# Patient Record
Sex: FEMALE | Race: OTHER
[De-identification: names, ages, dates, MRNs, and addresses within clinical notes are randomized per-mention and may not be internally consistent; named-entity substitution may affect disease eponyms.]

---

## 2021-01-12 ENCOUNTER — HOSPITAL ENCOUNTER (OUTPATIENT)
Dept: HOSPITAL 61 - LAB | Age: 56
End: 2021-01-12
Attending: ORTHOPAEDIC SURGERY
Payer: COMMERCIAL

## 2021-01-12 DIAGNOSIS — Z20.828: ICD-10-CM

## 2021-01-12 DIAGNOSIS — Z01.812: Primary | ICD-10-CM

## 2021-01-12 PROCEDURE — U0003 INFECTIOUS AGENT DETECTION BY NUCLEIC ACID (DNA OR RNA); SEVERE ACUTE RESPIRATORY SYNDROME CORONAVIRUS 2 (SARS-COV-2) (CORONAVIRUS DISEASE [COVID-19]), AMPLIFIED PROBE TECHNIQUE, MAKING USE OF HIGH THROUGHPUT TECHNOLOGIES AS DESCRIBED BY CMS-2020-01-R: HCPCS

## 2021-01-14 NOTE — PDOC1
History and Physical


Date of Admission


Date of Admission


1/15/2021





Identification/Chief Complaint


Chief Complaint


Right knee pain





Source


Source:  Chart review, Patient





History of Present Illness


History of Present Illness


55-year-old wtih right knee pain. Works as a deputy. History of right knee 

meniscus tear with arthroscopic meniscectomy by me in . She describes p

rogressive bilateral knee pain for years that has since especially progressed 

after working a lot of overtime and shopping for [employer]. Patient locates 

sharp and throbbing pain that became so severe that she eventually presented to 

[facility] ED on 20 where an MRI was ordered, as shown

below with recurrent meniscus tear. Since then, patient reports that her pain 

has persisted and is aggravated by movement, walking, and standing. She also 

notices some mechanical loose body symptoms: locking with a feeling like her 

knee "needs to pop". She has used a brace which has helped. Ambulating with 

single crutch.





Current Medications


Current Medications





Current Medications


Ondansetron HCl (Zofran) 4 mg PRN Q6HRS  PRN IV NAUSEA/VOMITING;  Start 1/15/21 

at 07:00;  Stop 21 at 06:59


Fentanyl Citrate (Fentanyl 2ml Vial) 25 mcg PRN Q5MIN  PRN IV MILD PAIN 1-3;  

Start 1/15/21 at 07:00;  Stop 21 at 06:59


Fentanyl Citrate (Fentanyl 2ml Vial) 50 mcg PRN Q5MIN  PRN IV MODERATE TO SEVERE

PAIN;  Start 1/15/21 at 07:00;  Stop 21 at 06:59


Morphine Sulfate (Morphine Sulfate) 1 mg PRN Q10MIN  PRN IV SEVERE PAIN 7-10;  

Start 1/15/21 at 07:00;  Stop 21 at 06:59;  Status UNV


Ringer's Solution 1,000 ml @  30 mls/hr Q24H IV ;  Start 1/15/21 at 07:00;  Stop

1/15/21 at 18:59


Lidocaine HCl (Xylocaine-Mpf 1% 2ml Vial) 2 ml PRN 1X  PRN ID PRIOR TO IV START;

 Start 1/15/21 at 07:00;  Stop 21 at 06:59


Hydromorphone HCl (Dilaudid) 0.5 mg PRN Q10MIN  PRN IV SEV PAIN, Second choice; 

Start 1/15/21 at 07:00;  Stop 21 at 06:59;  Status UNV


Prochlorperazine Edisylate (Compazine) 5 mg PACU PRN  PRN IV NAUSEA, MRX1;  

Start 1/15/21 at 07:00;  Stop 21 at 06:59


Vancomycin HCl 250 ml @  250 mls/hr 1X PREOP  PRN IV PRIOR TO SURGERY;  Start 

21 at 17:30





Active Scripts


Active


Reported


Azelastine Hcl 137 Mcg/0.137 Ml Spray.pump 137 Mcg NS DAILY


Fluticasone Propionate Nasal Spray (Fluticasone Propionate) 16 Gm Spray.susp 2 

Spray NS DAILY


Aspirin 81 Mg Tab.chew 81 Mg PO DAILY


Hydrochlorothiazide Capsule  ** (Hydrochlorothiazide) 12.5 Mg Capsule 12.5 Mg PO

DAILY


Lovastatin 20 Mg Tablet 20 Mg PO HS


Lotrel 5-10 Mg Capsule (Amlodipine Besylate/Benazepril) 1 Each Capsule 1 Each PO

DAILY


Flax Oil (Flaxseed Oil) 1,000 Mg Capsule 1,000 Mg PO DAILY


Daily Value (Multivitamin) 1 Each Tablet 1 Each PO DAILY





Allergies


Allergies:  


Coded Allergies:  


     Penicillins (Verified  Allergy, Intermediate, SWELLING, ITCHING, RASH, 

21)


     codeine (Verified  Allergy, Intermediate, SWELLING,ITCHING, RASH, 21)


     hydrocodone (Verified  Allergy, Intermediate, SWELLING, ITCHING, 21)


     ibuprofen (Verified  Allergy, Intermediate, SWELLING,ITCHING, RASH, 

21)


     clindamycin (Verified  Allergy, Mild, Nausea and Vomiting, 21)


     tramadol (Verified  Allergy, Mild, Nausea and Vomiting, 21)





Physical Exam


General:  Alert, Cooperative


HEENT:  Atraumatic


Lungs:  Normal air movement


Heart:  RRR


Abdomen:  Soft


Extremities:  Other (The RIGHT knee shows normal alignment, no masses and no 

effusion. There is tenderness at the medial joint line and a positive medial 

Aundrea's test. The lateral joint line shows no tenderness. Range of motion is 

0-135 degrees. There is trace patellofemoral crepitus. Aundrea's test is 

positive. There is medial joint line pain with deep flexion and especially with 

rotation of the tibia. The knee is stable to varus and valgus stress without 

subluxation or laxity. The ACL feels intact on Lachman testing. Muscle strength 

is normal (5/5) for quadriceps and hamstrings, and muscle tone is normal. The 

skin is normal with no scars, rashes, lesions or ulcers. Light touch sensation 

is intact. No edema and no varicosities. Dorsalis pedis pulse is intact and 

capillary refill is normal.  )





Images


Images


Outside MRI from [facility] right knee 2020 shows 

distinct medial meniscus tear series 401 image 13 and 12. There is some 

posterior femoral chondromalacia on series 401 image 11.


Impression:


1. Horizontal tear extends through the posterior horn and body of the medial 

meniscus.


2. More vertical tearing of the junction of the posterior horn and body of 

lateral meniscus.


3. Ligaments are intact.


4. A loose body is noted along the inferior aspect of the suprapatellar bursa 

measuring 8 mm.











[facility]  


                              [address]  [place], [place] [id number] [phone]  


 


                                           IMAGING REPORT  


 


                                               Signed  


 


PATIENT: CHELSEY CURRIE    ACCOUNT: [id number]            MRN#: 

[id number]  


: 1965                  LOCATION: [facility]               AGE: 55  


SEX: F                           EXAM DT: 20                ACCESSION#: 

[id number]  


STATUS: PRE CLI                  ORD. PHYSICIAN: GATO COY MD     


REASON: BILAT KNEE PAIN  


PROCEDURE: KNEE STANDING BILAT AP  


 


XR KNEE_AP BILAT STANDING, XR KNEE 1-2 VIEWS 2020 9:36 AM  


 


 INDICATION: Bilateral knee pain  


 


 COMPARISON: None available.  


 


 TECHNIQUE:  3 views the right and 3 views left knee are provided.  


 


 FINDINGS/  


 IMPRESSION:  


 


 No significant knee joint effusion. There is no acute fracture or dislocation. 

Joint mild 


patellofemoral joint space narrowing of the right knee with marginal 

osteophytosis as well as mild 


osteoarthrosis. Mild bilateral medial femorotibial osteoarthrosis with mild 

joint space scarring. 


Bone mineralization is within normal limits. Regional soft tissues are within 

normal limits. There 


is no soft tissue gas or osseous erosion. No radiopaque foreign body.  


 


 Electronically signed by: Erin Peñaloza MD (2020 10:51 AM) 

[id number]  


 


 


 


 


DICTATED and SIGNED BY:     ERIN PEÑALOZA MD  


DATE:     20 1050





VTE Prophylaxis Ordered


VTE Prophylaxis Devices:  Yes


VTE Pharmacological Prophylaxi:  Yes





Assessment/Plan


Assessment/Plan


Her MRI shows a medial and lateral meniscus tear. We reviewed her MRI with 

previous x-rays and discussed the natural history of the condition as well as 

the risks, benefits, and alternatives to treatment. Given her failure of more 

conservative measures and continued severe pain with loss of function, my 

recommendation is arthroscopic surgery. Plan for right knee arthroscopy with 

meniscectomy and possible loose body removal. We discussed potential risks of 

arthroscopic surgery, including risks of bleeding, infection, progressive 

arthritis,


blood clots, or other potential surgical or anesthetic complications. We also 

discussed postoperative treatment and expectations including progression of 

arthritis following knee arthroscopy. Meniscus tears unfortunately are a risk 

factor to develop osteoarthritis. My current recommendation is for arthroscopic 

surgery considering her age and the minimal degenerative changes seen 

radiographically. The complex meniscus tear is a sign that her knee will further

deteriorate in the future, hopefully years from now, and she might ultimately 

need knee replacement when she is in her 60s or 70s. I do not recommend knee 

replacement with these minimal degenerative changes, and believe that she has 

much to benefit from an arthroscopic procedure. All of her questions were 

answered and she desires to proceed with surgery.  She is here today for 

elective right knee arthroscopic medial and lateral meniscectomy.





Justifications for Admission


Other Justification














GATO COY MD                 2021 19:44

## 2021-01-15 ENCOUNTER — HOSPITAL ENCOUNTER (OUTPATIENT)
Dept: HOSPITAL 61 - SURG | Age: 56
Discharge: HOME | End: 2021-01-15
Attending: ORTHOPAEDIC SURGERY
Payer: COMMERCIAL

## 2021-01-15 VITALS
DIASTOLIC BLOOD PRESSURE: 78 MMHG | DIASTOLIC BLOOD PRESSURE: 78 MMHG | SYSTOLIC BLOOD PRESSURE: 168 MMHG | SYSTOLIC BLOOD PRESSURE: 168 MMHG | SYSTOLIC BLOOD PRESSURE: 168 MMHG | SYSTOLIC BLOOD PRESSURE: 168 MMHG | DIASTOLIC BLOOD PRESSURE: 78 MMHG | DIASTOLIC BLOOD PRESSURE: 78 MMHG | DIASTOLIC BLOOD PRESSURE: 78 MMHG | SYSTOLIC BLOOD PRESSURE: 168 MMHG

## 2021-01-15 VITALS — HEIGHT: 64.5 IN | WEIGHT: 225 LBS | BODY MASS INDEX: 37.95 KG/M2

## 2021-01-15 DIAGNOSIS — Z88.1: ICD-10-CM

## 2021-01-15 DIAGNOSIS — E78.00: ICD-10-CM

## 2021-01-15 DIAGNOSIS — S83.231A: Primary | ICD-10-CM

## 2021-01-15 DIAGNOSIS — I10: ICD-10-CM

## 2021-01-15 DIAGNOSIS — Z79.82: ICD-10-CM

## 2021-01-15 DIAGNOSIS — M94.261: ICD-10-CM

## 2021-01-15 DIAGNOSIS — Z79.899: ICD-10-CM

## 2021-01-15 DIAGNOSIS — Z90.710: ICD-10-CM
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
## 2021-01-15 DIAGNOSIS — Z88.8: ICD-10-CM

## 2021-01-15 DIAGNOSIS — Z88.0: ICD-10-CM
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
## 2021-01-15 DIAGNOSIS — Z98.51: ICD-10-CM

## 2021-01-15 DIAGNOSIS — Z98.890: ICD-10-CM

## 2021-01-15 DIAGNOSIS — S83.271A: ICD-10-CM

## 2021-01-15 DIAGNOSIS — Z88.5: ICD-10-CM
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
## 2021-01-15 DIAGNOSIS — K21.9: ICD-10-CM

## 2021-01-15 DIAGNOSIS — G47.30: ICD-10-CM

## 2021-01-15 PROCEDURE — 29880 ARTHRS KNE SRG MNISECTMY M&L: CPT

## 2021-01-15 RX ADMIN — FENTANYL CITRATE PRN MCG: 50 INJECTION INTRAMUSCULAR; INTRAVENOUS at 13:21

## 2021-01-15 RX ADMIN — SODIUM CHLORIDE, SODIUM LACTATE, POTASSIUM CHLORIDE, AND CALCIUM CHLORIDE SCH MLS/HR: .6; .31; .03; .02 INJECTION, SOLUTION INTRAVENOUS at 10:27

## 2021-01-15 RX ADMIN — SODIUM CHLORIDE, SODIUM LACTATE, POTASSIUM CHLORIDE, AND CALCIUM CHLORIDE SCH MLS/HR: .6; .31; .03; .02 INJECTION, SOLUTION INTRAVENOUS at 13:07

## 2021-01-15 RX ADMIN — FENTANYL CITRATE PRN MCG: 50 INJECTION INTRAMUSCULAR; INTRAVENOUS at 13:06

## 2021-01-15 NOTE — PDOC4
Operative Note


Operative Note


Date of Procedure:   January 15, 2021


Preoperative Diagnosis:  right knee medial and lateral meniscus tears


Postoperative Diagnosis:  


* complex tear medial meniscus, current injury, right knee, initial encounter, 

  S83.231A


* complex tear of lateral meniscus, current injury, right knee, initial 

  encounter, S83.271A


Procedures Performed:    right knee arthroscopy, surgical, with meniscectomy, 

medial AND lateral, including meniscal shaving, including debridement/shaving of

articular cartilage (chondroplasty) CPT 37671


Surgeon:  Gato Che MD


Assistant:  KIRBY Spann


Anesthesia:  General


Estimated Blood Loss:  10 mL   


Specimens:  none 


Drains:  none  


Complications:  none   


Tourniquet time:  34 minutes at 300 mm Hg





Indications for Procedure: The patient is a 55-year-old with right knee pain, 

unrelieved with nonoperative treatment. Exam and MRI are consistent with a 

meniscus tear. We talked about the risks and benefits of proceeding with an 

arthroscopic procedure. We talked about potential risks of ongoing pain, 

progressive arthritis, bleeding, infection, blood clots, or other potential 

surgical or anesthetic complications. All of the patient's questions about 

surgery were answered and they desired to proceed. Written consent was obtained.







Description of Operation:


The patient was identified in the preoperative holding area. The correct right 

knee was marked by me. The patient was taken to the operating room, where a 

general anesthetic was used. Preoperative antibiotics were given intravenously. 

A time-out procedure was performed. A tourniquet was placed on the upper thigh. 

Local anesthetic 20 mL of 0.25% bupivacaine was injected using sterile 

technique into the knee joint. The limb was prepared circumferentially with 

ChloraPrep solution and sterile waterproof arthroscopy drapes were applied. The

limb was exsanguinated with an Esmarch bandage and the tourniquet was inflated.





Lateral and medial arthroscopy portals were established. The medial meniscus 

showed a complex unrepairable tear with unstable flaps.  A meniscectomy was 

performed with basket forceps and the motorized shaver back to a smooth stable 

base, and the resection tapered into the middle one-third of the meniscus.The 

medial tibiofemoral joint showed chondromalacia Outerbridge grade I, so no 

chondroplasty was required.The intercondylar notch was free of loose bodies, and

the ACL was intact. 





The lateral tibiofemoral joint showed a complex unrepairable meniscus tear, and 

a meniscectomy was performed with basket forceps and the motorized shaver back 

to a smooth stable base.The lateral articular surfaces showed chondromalacia 

Outerbridge grade II, so a shaving chondroplasty was performed removing loose 

unstable fragments of articular cartilage. 





The patellofemoral joint showed chondromalacia Outerbridge grade II, so a 

shaving chondroplasty was performed removing loose unstable fragments of 

articular cartilage. The suprapatellar pouch, medial and lateral gutters were 

free of loose bodies.  There was a little bit of synovium around the patella 

which maybe is mimicking the loose body.  No loose body could be detected.  I a

lso placed the arthroscope in the posterior compartment and there was no loose 

body in that location.  Copious irrigation was used to drain all meniscal and 

chondral fragments, and the knee was drained of fluid. The portals were closed 

with #3-0 Prolene interrupted sutures.  Additional local anesthetic, 30 mL of 

0.25% bupivacaine with epinephrine was injected. A bulky sterile dressing was 

applied and the tourniquet was released. 





Needle and sponge counts were correct and there were no apparent complications.











GATO CHE MD                 Raad 15, 2021 13:07

## 2021-07-30 ENCOUNTER — HOSPITAL ENCOUNTER (OUTPATIENT)
Dept: HOSPITAL 61 - KCIC MRI | Age: 56
End: 2021-07-30
Payer: COMMERCIAL

## 2021-07-30 DIAGNOSIS — M48.07: ICD-10-CM

## 2021-07-30 DIAGNOSIS — M47.816: Primary | ICD-10-CM

## 2021-07-30 DIAGNOSIS — M51.47: ICD-10-CM

## 2021-07-30 DIAGNOSIS — M51.27: ICD-10-CM

## 2021-07-30 DIAGNOSIS — M43.17: ICD-10-CM

## 2021-07-30 DIAGNOSIS — M25.78: ICD-10-CM

## 2021-07-30 PROCEDURE — 72148 MRI LUMBAR SPINE W/O DYE: CPT

## 2021-07-30 NOTE — KCIC
EXAM: Lumbar spine MRI without contrast.



HISTORY: Pain.



TECHNIQUE: Multiplanar, multisequence magnetic resonance imaging of the lumbar spine was performed wi
thout contrast.



COMPARISON: 8/7/2006



FINDINGS: There is mild lumbar levocurvature. There is 3 mm retrolisthesis of L5 on S1. There is dege
nerative endplate remodeling with disc space narrowing, osteophytosis, Schmorl's node formation and d
isc desiccation at L5-S1. There is also disc desiccation at L4-L5. There are few osseous hemangiomas.
 There is no suspicious osseous lesion. There is no fracture. The conus terminates at L1.



At L1-L2, there is no stenosis.



At L2-L3, there is no stenosis.



At L3-L4, there is a minimal disc bulge and endplate remodeling. There is mild left facet arthropathy
. There is no stenosis.



At L4-L5, there is a shallow broad-based left paracentral to lateral recess disc protrusion with arely
lar tear superimposed on a disc bulge and endplate remodeling. There is mild bilateral facet arthropa
thy. There is mild left foraminal stenosis. There is narrowing of the left lateral recess. There is m
inimal central canal stenosis.



At L5-S1, there is a shallow broad-based posterior central disc protrusion with 3 mmHg inferior extru
rustam and there are bilateral foraminal to lateral disc osteophyte complexes superimposed on a diffuse
 disc bulge and endplate remodeling. There is slight retrolisthesis. There is moderate right and mild
 left foraminal stenosis with abutment the exiting right greater than left L5 nerve roots.



IMPRESSION: Degenerative change at the lower lumbar levels, described in detail above. This is minima
lly increased compared to the prior study, resulting in mild left foraminal stenosis and narrowing of
 the left lateral recess with minimal central canal stenosis at L4-L5 and moderate right and mild lef
t foraminal stenosis at L5-S1.



Electronically signed by: Kaye Truong MD (7/30/2021 4:28 PM) [id number]

## 2021-08-24 ENCOUNTER — HOSPITAL ENCOUNTER (OUTPATIENT)
Dept: HOSPITAL 61 - PNCL | Age: 56
Discharge: HOME | End: 2021-08-24
Attending: ANESTHESIOLOGY
Payer: COMMERCIAL

## 2021-08-24 DIAGNOSIS — Z90.710: ICD-10-CM

## 2021-08-24 DIAGNOSIS — K21.9: ICD-10-CM

## 2021-08-24 DIAGNOSIS — Z88.8: ICD-10-CM

## 2021-08-24 DIAGNOSIS — G47.30: ICD-10-CM

## 2021-08-24 DIAGNOSIS — E78.00: ICD-10-CM

## 2021-08-24 DIAGNOSIS — Z88.0: ICD-10-CM

## 2021-08-24 DIAGNOSIS — Z88.1: ICD-10-CM

## 2021-08-24 DIAGNOSIS — M19.90: ICD-10-CM

## 2021-08-24 DIAGNOSIS — I10: ICD-10-CM

## 2021-08-24 DIAGNOSIS — Z88.5: ICD-10-CM

## 2021-08-24 DIAGNOSIS — M54.5: Primary | ICD-10-CM

## 2021-08-24 DIAGNOSIS — Z98.890: ICD-10-CM

## 2021-08-24 DIAGNOSIS — M79.605: ICD-10-CM

## 2021-08-24 DIAGNOSIS — Z98.51: ICD-10-CM

## 2021-08-24 DIAGNOSIS — Z79.82: ICD-10-CM

## 2021-08-24 DIAGNOSIS — Z79.899: ICD-10-CM

## 2021-08-24 PROCEDURE — G0463 HOSPITAL OUTPT CLINIC VISIT: HCPCS

## 2021-08-24 NOTE — PDOC1
INITIAL PAIN CONSULT


DATE OF SERVICE:


DOS:


DATE: 8/24/21 


TIME: 15:56





CHIEF COMPLAINT:


Chief Complaint:


Low back and left lower extremity pain





HISTORY OF PRESENT ILLNESS:


56-year-old female presents with history of pain low back left lower extremity 

for many years worse over the past 1 year or so not the result of any specific 

injury or accident that she is aware but has been multiple injuries over the 

years by her report.  Patient reports pain in the low back now rating the left 

lower extremity posterior gluteus posterior lateral thigh lateral anterior thigh

anteromedial thigh medial lower leg and calf on the left side primarily some on 

the right but mostly on the left patient reports is worse with walking standing 

changing positions getting up from a seated position is waking her from sleep at

night least once or twice through the night patient reports does affect her 

ability to walk which does not use any assistive devices.  Patient is starting 

physical therapy tomorrow patient reports the pain is sharp in the low back 

throbbing the legs intermittent intensity tingling and numbness in the back and 

leg cramping and aching in the left lower extremity as well as in the low back. 

Patient reports her disability rating 0-10 10 made worse with 9 with family home

responsibilities 10 with recreation social activity and occupational activity 8 

with self-care and 8 with light support activities specially with sleeping.  

Patient has been taking over-the-counter medications such as Tylenol and Motrin 

which do decrease the pain fairly significantly but only by about 30 to 40% and 

Advil tends to work better.  Patient did have an MRI scan lumbar spine show 

degenerative change in the lower lumbar levels with L4-5 shallow broad-based 

left paracentral to lateral recess disc protrusion and annular tear with mild 

foraminal stenosis L5-S1 shows central broad-based central disc protrusion with 

bilateral to lateral disc osteophyte complexes and moderate right and mild left 

foraminal stenosis with abutment of the exiting right greater than left L5 nerve

roots.





PAST MEDICAL HISTORY:


PMH:


Hypertension, arthritis, multiple allergies





PREVIOUS SURGERIES:


Past Surgical Hx:


Hysterectomy, tubal ligation, right colon resection





CURRENT MEDICATIONS:


Current Meds:





Active Scripts








 Medications  Dose


 Route/Sig


 Max Daily Dose Days Date Category Dose


Instructions


 


 Vitamin D3 **


  (Vitamin D) 125


 Mcg Capsule  125 Mcg


 PO DAILY


   8/24/21 Reported  5,000 UNITS = 125 MCG


 


 Klor-Con 10


  (Potassium


 Chloride) 10 Meq


 Tablet.er  1 Tab


 PO DAILY


  30 8/24/21 Reported 


 


 Turmeric 500 mg


 Capsule


  (Turmeric/Turmeric


 Root Extract) 1


 Each Capsule  1 Cap


 PO BID


  30 8/24/21 Reported 


 


 Azelastine Hcl


 137 Mcg/0.137 Ml


 Spray.pump  137 Mcg


 NS DAILY


   1/13/21 Reported 


 


 Fluticasone


 Propionate Nasal


 Spray


  (Fluticasone


 Propionate) 16 Gm


 Spray.susp  2 Spray


 NS DAILY


   1/13/21 Reported 


 


 Aspirin 81 Mg


 Tab.chew  81 Mg


 PO DAILY


   1/13/21 Reported 


 


 Hydrochlorothiazide


 Capsule  **


  (Hydrochlorothiazide)


 12.5 Mg Capsule  12.5 Mg


 PO DAILY


   1/13/21 Reported 


 


 Lovastatin 20 Mg


 Tablet  20 Mg


 PO HS


   1/13/21 Reported 


 


 Lotrel 5-10 Mg


 Capsule


  (Amlodipine


 Besylate/Benazepril)


 1 Each Capsule  1 Each


 PO DAILY


   1/13/21 Reported 


 


 Flax Oil


  (Flaxseed Oil)


 1,000 Mg Capsule  1,000 Mg


 PO DAILY


   1/13/21 Reported 


 


 Daily Value


  (Multivitamin) 1


 Each Tablet  1 Each


 PO DAILY


   1/13/21 Reported 











ALLERGIES;


Allergies:  


Coded Allergies:  


     Penicillins (Verified  Allergy, Intermediate, SWELLING, ITCHING, RASH, 

1/15/21)


     codeine (Verified  Allergy, Intermediate, SWELLING,ITCHING, RASH, 1/15/21)


     hydrocodone (Verified  Allergy, Intermediate, SWELLING, ITCHING, 1/15/21)


     ibuprofen (Verified  Allergy, Intermediate, SWELLING,ITCHING, RASH, 

1/15/21)


     clindamycin (Verified  Allergy, Mild, Nausea and Vomiting, 1/15/21)


     tramadol (Verified  Allergy, Mild, Nausea and Vomiting, 1/15/21)





FAMILY HISTORY:


Family Hx:


No major medical problems or conditions that she is aware of.





SOCIAL HISTORY:


Social Hx:


Patient is nondrug alcohol does not smoke not use any illegal illicit 

recreational drugs is single lives locally at home has 1 child living at home 

with her works in the [employer] and lives locally in [facility]





REVIEW OF SYSTEMS:


ROS:


Positive for those items mentioned in history of present illness, all systems 

are reviewed, otherwise negative ,and are complete full and well-documented on 

patient's chart.





PHYSICAL EXAM:


VS:


Blood pressure is 141/90 pulse 78 respirations 18 temperature 98.9 F height is 

5 feet 4 inches weight is 242 pounds


PE:


PHYSICAL EXAMINATION:





GENERAL: The patient is awake, alert, oriented, appropriate, very pleasant in 

demeanor


HEENT: Shows normocephalic, atraumatic.  Extraocular movements are intact and 

symmetrical.  Oral cavity: Mucous membranes moist and pink.  Dentition is 

intact.


NECK: Shows anterior throat supple without palpable lymphadenopathy noted.  

Swallow reflex symmetrical.


CHEST: Shows normal on inspection.  Breath sounds are clear bilaterally, no 

rales rhonchi wheezes auscultated.


HEART: Shows S1, S2 clear.  No murmurs auscultated.


ABDOMEN: Soft, nontender, nondistended, obese.  No palpable organomegaly is 

noted.  No rebound or guarding demonstrated.


BACK: Shows spine grossly in the midline.  Normal-appearing cervical lordotic 

curvature.  There is slightly increased thoracic kyphosis, some minor flattening

of the lumbar lordotic curvature.  Lumbar paraspinous muscles show symmetrical 

on inspection, on palpation shows some moderate tenderness diffusely throughout 

the upper, middle and lower distribution of the paraspinous muscles bilaterally 

and also into the lower thoracic paraspinous musculature, firm and tender, but 

without specific trigger points, without radiation of pain.  The patient has 

good rotational motion of the lumbar spine, both laterally as well as extension 

and flexion without significant difficulty.  No tenderness over the spinous 

processes, sacrum or sacroiliac regions.


EXTREMITIES: Lower extremities show deep tendon reflexes 2+ in the patellar and 

tendo calcaneus tendons.  Motor exam is 5 on a scale of 5 with right 

dorsiflexion, extension, quadriceps and hamstring flexion and 4/5 on the left.  

Peripheral pulses are 1+ posterior tibial.  No peripheral edema is noted 

bilaterally.  Lower extremities are warm and dry to touch, equal in color and 

appearance.  Straight leg raise noted to be positive on the left at approximate 

40 degrees, decreased with knee flexion, right side is negative.  Gaenslen's and

True's maneuvers are negative bilaterally.  The patient is able to stand, 

stand on her toes without significant difficulty or loss of balance walks with a

slight favoring gait does appear to favor the left lower extremities with the 

right but is not using any assistive devices to ambulate.


SKIN: Shows warm and dry, good turgor.  No edema.  No sores, rashes or bruising 

throughout.





IMPRESSION:


Impression:


56-year-old female with long history low back left lower extremity pain and 

radicular fashion


MRI scan lumbar spine as noted


Arthritis


Hypertension





Plan: Options were discussed with the patient including conservative medical 

management physical therapies and interventional techniques.  Patient would like

to consider interventional techniques that she start her physical therapy 

tomorrow.  Patient will see how physical therapy proceeds and is still 

significant pain in the back and left lower extremity, will have her contact the

office for follow-up at that time.











MARYSOL TILLEY MD               Aug 24, 2021 16:02

## 2021-12-08 ENCOUNTER — HOSPITAL ENCOUNTER (OUTPATIENT)
Dept: HOSPITAL 61 - KCIC MRI | Age: 56
End: 2021-12-08
Payer: COMMERCIAL

## 2021-12-08 DIAGNOSIS — R60.0: ICD-10-CM

## 2021-12-08 DIAGNOSIS — M25.462: ICD-10-CM
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
## 2021-12-08 DIAGNOSIS — S83.207A: Primary | ICD-10-CM

## 2021-12-08 DIAGNOSIS — M71.22: ICD-10-CM

## 2021-12-08 DIAGNOSIS — M22.8X2: ICD-10-CM
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
## 2021-12-08 DIAGNOSIS — M67.52: ICD-10-CM

## 2021-12-08 DIAGNOSIS — M17.12: ICD-10-CM

## 2021-12-08 PROCEDURE — 73721 MRI JNT OF LWR EXTRE W/O DYE: CPT

## 2021-12-08 NOTE — KCIC
Examination: MRI of the left knee without contrast



HISTORY: History of left knee pain



COMPARISON: None available 



Technique: Multiplanar, multisequence MR imaging of the left knee was performed



FINDINGS:



The anterior cruciate ligament, posterior cruciate ligament appears intact. Horizontal increased sign
al identified in the body and posterior horn of the medial meniscus likely tear probably degenerative
 tear. There is a tiny cystic structure extending medially from the body of the medial meniscus measu
ring 4 mm likely meniscal cyst. The lateral meniscus appears intact. The medial collateral ligament i
s intact. Lateral collateral ligamentous complex appearing the fibular collateral ligament, biceps fe
moreno renal comparison appears intact



The extensor mechanism is intact. There is moderate joint space loss identified in the medial, latera
l and patellofemoral compartments. There is mild trabecular edema identified in the posterior medial 
femoral condyle and anterior aspect of the tibial plateau.



The medial, lateral retinaculum appears intact.



Small knee joint effusion with small popliteal cyst. Moderate joint space loss medial, lateral, flood
lofemoral compartments.



IMPRESSION:



1.  Horizontal increased signal identified in the body and posterior horn of the medial meniscus like
ly degenerative tear. There is a tiny cystic structure extending medially from the body of the medial
 meniscus measuring 4 mm likely meniscal cyst.

2.  Moderate tricompartmental degenerative changes with mild trabecular edema identified in the poste
rior medial femoral condyle and anterior aspect of the tibial plateau.

3.  Small knee joint effusion with small popliteal cyst.



Electronically signed by: Vincent Scott MD (12/8/2021 2:24 PM) [id number]

## 2022-04-18 ENCOUNTER — HOSPITAL ENCOUNTER (OUTPATIENT)
Dept: HOSPITAL 61 - RAD | Age: 57
End: 2022-04-18
Attending: INTERNAL MEDICINE
Payer: COMMERCIAL

## 2022-04-18 DIAGNOSIS — M17.12: ICD-10-CM

## 2022-04-18 DIAGNOSIS — M25.762: ICD-10-CM

## 2022-04-18 DIAGNOSIS — Z02.71: Primary | ICD-10-CM

## 2022-04-18 PROCEDURE — 73560 X-RAY EXAM OF KNEE 1 OR 2: CPT

## 2022-04-18 NOTE — RAD
EXAM:  XR KNEE_LT 1-2 VIEWS 4/18/2022 9:22 AM



CLINICAL INDICATION:  Left knee pain



COMPARISON:  None



TECHNIQUE:  AP and lateral views of the left knee



FINDINGS:  No acute fracture. Alignment is normal. There is mild medial compartment narrowing. Tiny t
ricompartmental osteophytes. Unchanged subchondral lucency in the patella. No joint effusion.



IMPRESSION:  Unchanged mild tricompartmental degenerative joint disease.



Electronically signed by: Nuris Albert MD (4/18/2022 10:25 AM) [id number]

## 2022-04-29 ENCOUNTER — HOSPITAL ENCOUNTER (EMERGENCY)
Dept: HOSPITAL 61 - ER | Age: 57
Discharge: HOME | End: 2022-04-29
Payer: COMMERCIAL

## 2022-04-29 VITALS — DIASTOLIC BLOOD PRESSURE: 74 MMHG | SYSTOLIC BLOOD PRESSURE: 122 MMHG

## 2022-04-29 VITALS — HEIGHT: 64 IN | WEIGHT: 230.38 LBS | BODY MASS INDEX: 39.33 KG/M2

## 2022-04-29 DIAGNOSIS — E78.00: ICD-10-CM

## 2022-04-29 DIAGNOSIS — Z88.8: ICD-10-CM

## 2022-04-29 DIAGNOSIS — Z88.1: ICD-10-CM

## 2022-04-29 DIAGNOSIS — Z88.6: ICD-10-CM

## 2022-04-29 DIAGNOSIS — F43.0: Primary | ICD-10-CM

## 2022-04-29 DIAGNOSIS — Z88.0: ICD-10-CM

## 2022-04-29 DIAGNOSIS — R07.89: ICD-10-CM

## 2022-04-29 DIAGNOSIS — M54.2: ICD-10-CM

## 2022-04-29 DIAGNOSIS — I49.3: ICD-10-CM

## 2022-04-29 DIAGNOSIS — I10: ICD-10-CM

## 2022-04-29 DIAGNOSIS — Z88.5: ICD-10-CM

## 2022-04-29 LAB
ALBUMIN SERPL-MCNC: 3.5 G/DL (ref 3.4–5)
ALBUMIN/GLOB SERPL: 0.9 {RATIO} (ref 1–1.7)
ALP SERPL-CCNC: 84 U/L (ref 46–116)
ALT SERPL-CCNC: 39 U/L (ref 14–59)
ANION GAP SERPL CALC-SCNC: 6 MMOL/L (ref 6–14)
AST SERPL-CCNC: 21 U/L (ref 15–37)
BASOPHILS # BLD AUTO: 0 X10^3/UL (ref 0–0.2)
BASOPHILS NFR BLD: 1 % (ref 0–3)
BILIRUB SERPL-MCNC: 0.5 MG/DL (ref 0.2–1)
BUN SERPL-MCNC: 12 MG/DL (ref 7–20)
BUN/CREAT SERPL: 13 (ref 6–20)
CALCIUM SERPL-MCNC: 8.8 MG/DL (ref 8.5–10.1)
CHLORIDE SERPL-SCNC: 104 MMOL/L (ref 98–107)
CO2 SERPL-SCNC: 31 MMOL/L (ref 21–32)
CREAT SERPL-MCNC: 0.9 MG/DL (ref 0.6–1)
EOSINOPHIL NFR BLD: 0.2 X10^3/UL (ref 0–0.7)
EOSINOPHIL NFR BLD: 3 % (ref 0–3)
ERYTHROCYTE [DISTWIDTH] IN BLOOD BY AUTOMATED COUNT: 13.4 % (ref 11.5–14.5)
GFR SERPLBLD BASED ON 1.73 SQ M-ARVRAT: 78.1 ML/MIN
GLUCOSE SERPL-MCNC: 123 MG/DL (ref 70–99)
HCT VFR BLD CALC: 40.3 % (ref 36–47)
HGB BLD-MCNC: 13.9 G/DL (ref 12–15.5)
LYMPHOCYTES # BLD: 1.5 X10^3/UL (ref 1–4.8)
LYMPHOCYTES NFR BLD AUTO: 28 % (ref 24–48)
MCH RBC QN AUTO: 31 PG (ref 25–35)
MCHC RBC AUTO-ENTMCNC: 34 G/DL (ref 31–37)
MCV RBC AUTO: 90 FL (ref 79–100)
MONO #: 0.4 X10^3/UL (ref 0–1.1)
MONOCYTES NFR BLD: 7 % (ref 0–9)
NEUT #: 3.5 X10^3/UL (ref 1.8–7.7)
NEUTROPHILS NFR BLD AUTO: 62 % (ref 31–73)
PLATELET # BLD AUTO: 197 X10^3/UL (ref 140–400)
POTASSIUM SERPL-SCNC: 3.5 MMOL/L (ref 3.5–5.1)
PROT SERPL-MCNC: 7.6 G/DL (ref 6.4–8.2)
RBC # BLD AUTO: 4.5 X10^6/UL (ref 3.5–5.4)
SODIUM SERPL-SCNC: 141 MMOL/L (ref 136–145)
WBC # BLD AUTO: 5.6 X10^3/UL (ref 4–11)

## 2022-04-29 PROCEDURE — 85025 COMPLETE CBC W/AUTO DIFF WBC: CPT

## 2022-04-29 PROCEDURE — 71045 X-RAY EXAM CHEST 1 VIEW: CPT

## 2022-04-29 PROCEDURE — 93005 ELECTROCARDIOGRAM TRACING: CPT

## 2022-04-29 PROCEDURE — 84484 ASSAY OF TROPONIN QUANT: CPT

## 2022-04-29 PROCEDURE — 36415 COLL VENOUS BLD VENIPUNCTURE: CPT

## 2022-04-29 PROCEDURE — 80053 COMPREHEN METABOLIC PANEL: CPT

## 2022-04-29 NOTE — RAD
EXAMINATION: Chest radiograph.



VIEWS: Single AP view of the chest



COMPARISON: None



INDICATION:56 years, Female, chest pain.



FINDINGS: 

Normal cardiomediastinal silhouette. Patchy bibasilar opacities are felt to be secondary to soft tiss
ue summation. No focal consolidation. No pleural effusion or pneumothorax. No acute osseous process.



IMPRESSION:

No confluent airspace disease.



Electronically signed by: Everardo Coats DO (4/29/2022 2:12 PM) [facility]

## 2022-04-29 NOTE — PHYS DOC
Past Medical History


Past Medical History:  High Cholesterol, Hypertension


 (IPLY MARISCAL)


Past Surgical History:  No Surgical History


 (PILY MARISCAL)


Smoking Status:  Never Smoker


Alcohol Use:  None


 (PILY MARISCAL)





General Adult


EDM:


Chief Complaint:  CHEST PAIN





HPI:


HPI:





Patient is a 56 year old male with past medical history including hypertension 

and hyperlipidemia who presents with left-sided chest and neck pain that began 

this morning while she was crying.  Patient states that about 1 week ago, she 

lost her job of 18 years.  She has been having difficulty navigating 

unemployment.  Patient was very upset this morning when she was told she would 

hear back, and has not.  She is very worried about financial matters and paying 

her bills.  Patient denies diaphoresis, lightheadedness, dizziness, weakness, 

nausea/vomiting associated with her pain today.  Patient has no other complaints

at this time.


 (PILY MARISCAL)





Review of Systems:


Review of Systems:


Constitutional:  Denies fever, chills or generalized weakness


Eyes:  Denies change in visual acuity, visual field deficits or discharge


HENT:  Denies ear pain, nasal congestion or sore throat 


Respiratory:  Denies cough or shortness of breath 


Cardiovascular:  See HPI


GI:  Denies abdominal pain, nausea, vomiting, bloody stools or diarrhea 


: Denies dysuria or hematuria


Musculoskeletal:  Denies back pain or joint pain 


Integument:  Denies rash or other skin lesion


Neurologic:  Denies headache, focal weakness or sensory changes


 (PILY MARISCAL)





Heart Score:


C/O Chest Pain:  Yes


HEART Score for Chest Pain:  








HEART Score for Chest Pain Response (Comments) Value


 


History Slighlty/Non-Suspicious 0


 


ECG Normal 0


 


Age >45 - < 65 1


 


Risk Factors >3 Risk Factors or Hx CAD 2


 


Troponin < Normal Limit 0


 


Total  3








Risk Factors:


Risk Factors:  HTN, HLD, obesity.


Risk Scores:


Score 0 - 3:  2.5% MACE over next 6 weeks - Discharge Home


Score 4 - 6:  20.3% MACE over next 6 weeks - Admit for Clinical Observation


Score 7 - 10:  72.7% MACE over next 6 weeks - Early Invasive Strategies


 (PILY MARISCAL)





Current Medications:





Current Medications








 Medications


  (Trade)  Dose


 Ordered  Sig/Yoni  Start Time


 Stop Time Status Last Admin


Dose Admin


 


 Hydroxyzine HCl


  (Atarax)  50 mg  1X  PRN  4/29/22 14:00


    4/29/22 15:30


50 MG








 (PILY MARISCAL)





Allergies:


Allergies:





Allergies








Coded Allergies Type Severity Reaction Last Updated Verified


 


  Penicillins Allergy Intermediate SWELLING, ITCHING, RASH 1/15/21 Yes


 


  codeine Allergy Intermediate SWELLING,ITCHING, RASH 1/15/21 Yes


 


  hydrocodone Allergy Intermediate SWELLING, ITCHING 1/15/21 Yes


 


  ibuprofen Allergy Intermediate SWELLING,ITCHING, RASH 1/15/21 Yes


 


  clindamycin Allergy Mild Nausea and Vomiting 1/15/21 Yes


 


  tramadol Allergy Mild Nausea and Vomiting 1/15/21 Yes








 (PILY MARISCAL)





Physical Exam:


PE:





Constitutional: Well developed, well nourished, no acute distress, non-toxic 

appearance, tearful.


HENT: Normocephalic, atraumatic, bilateral external ears normal, nose normal. 


Eyes: EOMI, conjunctiva normal, no discharge.  


Neck: Normal range of motion, no stridor.  


Cardiovascular: Heart regular rate and rhythm.  No apparent murmurs, rubs or 

gallops.


Lungs & Thorax: Equal thoracic expansion, no increased work of breathing.


Skin: Warm, dry, no erythema, no rash.  


Extremities: No cyanosis, no clubbing, ROM intact, no edema.  


Neurologic: Alert and oriented x4, normal motor function, normal sensory 

function, no focal deficits noted. 


 (PILY MARISCAL)





Current Patient Data:


Labs:





                                Laboratory Tests








Test


 4/29/22


12:45


 


White Blood Count


 5.6 x10^3/uL


(4.0-11.0)


 


Red Blood Count


 4.50 x10^6/uL


(3.50-5.40)


 


Hemoglobin


 13.9 g/dL


(12.0-15.5)


 


Hematocrit


 40.3 %


(36.0-47.0)


 


Mean Corpuscular Volume


 90 fL ()





 


Mean Corpuscular Hemoglobin 31 pg (25-35)  


 


Mean Corpuscular Hemoglobin


Concent 34 g/dL


(31-37)


 


Red Cell Distribution Width


 13.4 %


(11.5-14.5)


 


Platelet Count


 197 x10^3/uL


(140-400)


 


Neutrophils (%) (Auto) 62 % (31-73)  


 


Lymphocytes (%) (Auto) 28 % (24-48)  


 


Monocytes (%) (Auto) 7 % (0-9)  


 


Eosinophils (%) (Auto) 3 % (0-3)  


 


Basophils (%) (Auto) 1 % (0-3)  


 


Neutrophils # (Auto)


 3.5 x10^3/uL


(1.8-7.7)


 


Lymphocytes # (Auto)


 1.5 x10^3/uL


(1.0-4.8)


 


Monocytes # (Auto)


 0.4 x10^3/uL


(0.0-1.1)


 


Eosinophils # (Auto)


 0.2 x10^3/uL


(0.0-0.7)


 


Basophils # (Auto)


 0.0 x10^3/uL


(0.0-0.2)


 


Sodium Level


 141 mmol/L


(136-145)


 


Potassium Level


 3.5 mmol/L


(3.5-5.1)


 


Chloride Level


 104 mmol/L


()


 


Carbon Dioxide Level


 31 mmol/L


(21-32)


 


Anion Gap 6 (6-14)  


 


Blood Urea Nitrogen


 12 mg/dL


(7-20)


 


Creatinine


 0.9 mg/dL


(0.6-1.0)


 


Estimated GFR


(Cockcroft-Gault) 78.1  





 


BUN/Creatinine Ratio 13 (6-20)  


 


Glucose Level


 123 mg/dL


(70-99)  H


 


Calcium Level


 8.8 mg/dL


(8.5-10.1)


 


Total Bilirubin


 0.5 mg/dL


(0.2-1.0)


 


Aspartate Amino Transferase


(AST) 21 U/L (15-37)





 


Alanine Aminotransferase (ALT)


 39 U/L (14-59)





 


Alkaline Phosphatase


 84 U/L


()


 


Troponin I High Sensitivity 5 ng/L (4-50)  


 


Total Protein


 7.6 g/dL


(6.4-8.2)


 


Albumin


 3.5 g/dL


(3.4-5.0)


 


Albumin/Globulin Ratio


 0.9 (1.0-1.7)


L





                                Laboratory Tests


4/29/22 12:45








                                Laboratory Tests


4/29/22 12:45








Vital Signs:





                                   Vital Signs








  Date Time  Temp Pulse Resp B/P (MAP) Pulse Ox O2 Delivery O2 Flow Rate FiO2


 


4/29/22 12:05 98.6 94 22 145/76 (99) 97 Room Air  





 98.6       








 (PILY MARISCAL)





EKG:


EKG:


EKG Interpreted by Dr. Ruelas at 1221: Regular rate and rhythm 94 bpm with 

occasional PVCs.   ms/QTc 466 ms. No STEMI. 


EKG Interpreted by Dr. Ruelas at 1456: Regular rate and rhythm 79 bpm with no 

ectopic beats.   ms/QTc 430 ms. No STEMI. 


 (PILY MARISCAL)





Radiology/Procedures:


Radiology/Procedures:


PROCEDURE: PORTABLE CHEST 1V





EXAMINATION: Chest radiograph.





VIEWS: Single AP view of the chest





COMPARISON: None





INDICATION:56 years, Female, chest pain.





FINDINGS: 


Normal cardiomediastinal silhouette. Patchy bibasilar opacities are felt to be 

secondary to soft tissue summation. No focal consolidation. No pleural effusion 

or pneumothorax. No acute osseous process.





IMPRESSION:


No confluent airspace disease.





Electronically signed by: Everardo Coats DO (4/29/2022 2:12 PM) [facility]


 (PILY MARISCAL)





Course & Med Decision Making:


Course & Med Decision Making


Pertinent Labs and Imaging studies reviewed. (See chart for details)





Patient is a 56-year-old female who presents with chest pain after being acutely

distressed this morning.  Work-up today will consist of labs that include 

troponin, chest x-ray, EKG.





Work-up today is reassuring.  I believe the patient had an acute stress reaction

secondary to increased life stress regarding her unemployment status and 

difficulty obtaining income.  Patient was advised to follow-up with her primary 

care regarding psychological treatment during this time.  She was prescribed 

hydroxyzine to take in the interim.  Strict return precautions were provided.  

Patient understands and is agreeable to discharge plan.


 (PILY MARISCAL)





Dragon Disclaimer:


Dragon Disclaimer:


This electronic medical record was generated, in whole or in part, using a voice

recognition dictation system.


 (PILY MARISCAL)





Departure


Departure


Impression:  


   Primary Impression:  


   Acute stress reaction


   Additional Impressions:  


   PVC (premature ventricular contraction)


   Atypical chest pain


Disposition:  01 HOME / SELF CARE / HOMELESS


Condition:  IMPROVED


Referrals:  


ARASH HERNANDEZ MD (PCP)


Patient Instructions:  Stress





Additional Instructions:  


EMERGENCY DEPARTMENT GENERAL DISCHARGE INSTRUCTIONS





Thank you for coming to [facility] Emergency Department (ED) 

today and trusting us with you care.  We trust that you had a positive 

experience in our Emergency Department.  If you wish to speak to the department 

management, you may call the director at [phone].





YOUR FOLLOW UP INSTRUCTIONS ARE AS FOLLOWS:


1.  Follow up with your primary care doctor. If you do not have a primary 

doctor, please ask for a resource list of physicians or clinics that may be able

to assist you with follow up care.


2.  The emergency provider has interpreted your imaging studies, if any were 

ordered.  The radiology imaging specialist also reviewed them.  If there is a 

change in the findings, you will be notified in 48 hours when at all possible.


3.  If a lab test or culture has been done, your results will be reviewed and 

you will be notified if you need a change in treatment.


4.  Follow instructions verbalized to you and refer to the printouts if needed.





ADDITIONAL INSTRUCTIONS AND INFORMATION:


1.  Your care today has been supervised by a physician who is specially trained 

in emergency care.  Many problems require more than one evaluation for a 

complete diagnosis and treatment.  We recommend that you schedule your follow up

appointment as recommended to ensure complete treatment of you illness or 

injury.  If you are unable to obtain follow up care and continue to have a 

problem, or if your condition worsens, we recommend that you return to the ED.


2.  We are not able to safely determine your condition over the phone nor are we

able to give sound medical advice over the phone.  For these safety reasons, if 

you call for medical advice we will ask you to come to the ED for further 

evaluation.


3.  If you have any questions regarding these discharge instructions please call

the ED at [phone].





SAFETY INFORMATION:


In the interest of safety, wellness, and injury prevention; we encourage you to 

wear your seat belt, if you smoke; quite smoking, and we encourage family to use

a protective helmet for bicycling and other sporting events that present an 

increased risk for head injury.





IF YOUR SYMPTOMS WORSEN OR NEW SYMPTOMS DEVELOP, OR YOU HAVE CONCERNS ABOUT YOUR

CONDITION; OR IF YOUR CONDITION WORSENS WHILE YOU ARE WAITING FOR YOUR FOLLOW UP

APPOINTMENT; EITHER CONTACT YOUR PRIMARY CARE DOCTOR, THE PHYSICIAN WHOSE NAME 

AND NUMBER YOU WERE GIVEN, OR RETURN TO THE ED IMMEDIATELY.





Attending Signature


Attending Signature


I have reviewed the PA/NP's note and plan of care. I was available for 

consultation as needed during the patient's visit in the emergency department. I

agree with the clinical impression, plan, and disposition.


 (BRYSON RUELAS DO)











PILY MARISCAL              Apr 29, 2022 16:40


BRYSON RUELAS DO              May 3, 2022 23:32

## 2022-05-02 NOTE — EKG
[facility]

              [address] [place] [address]

Test Date:    2022               Test Time:    12:16:08

Pat Name:     CHELSEY CURRIE       Department:   

Patient ID:   [id number]           Room:          

Gender:       F                        Technician:   

:          [id number]               Requested By: PILY MARISCAL

Order Number: [id number]           Reading MD:     

                                 Measurements

Intervals                              Axis          

Rate:         94                       P:            33

VA:           144                      QRS:          -9

QRSD:         82                       T:            68

QT:           368                                    

QTc:          466                                    

                           Interpretive Statements

SINUS RHYTHM

VENTRICULAR PREMATURE COMPLEX(ES)

LEFTWARD AXIS

ABNORMAL ECG

RI6.02

No previous ECG available for comparison